# Patient Record
Sex: FEMALE | Race: WHITE | Employment: PART TIME | URBAN - METROPOLITAN AREA
[De-identification: names, ages, dates, MRNs, and addresses within clinical notes are randomized per-mention and may not be internally consistent; named-entity substitution may affect disease eponyms.]

---

## 2019-10-21 ENCOUNTER — HOSPITAL ENCOUNTER (EMERGENCY)
Age: 69
Discharge: HOME | End: 2019-10-21
Attending: EMERGENCY MEDICINE
Payer: MEDICARE

## 2019-10-21 VITALS
DIASTOLIC BLOOD PRESSURE: 73 MMHG | RESPIRATION RATE: 16 BRPM | SYSTOLIC BLOOD PRESSURE: 170 MMHG | TEMPERATURE: 98.6 F | HEART RATE: 100 BPM | OXYGEN SATURATION: 98 % | WEIGHT: 145.72 LBS

## 2019-10-21 DIAGNOSIS — K08.89 DENTALGIA: Primary | ICD-10-CM

## 2019-10-21 PROCEDURE — 99283 EMERGENCY DEPT VISIT LOW MDM: CPT

## 2019-10-21 PROCEDURE — 74011000250 HC RX REV CODE- 250: Performed by: NURSE PRACTITIONER

## 2019-10-21 PROCEDURE — 74011250637 HC RX REV CODE- 250/637: Performed by: NURSE PRACTITIONER

## 2019-10-21 RX ORDER — CHLORHEXIDINE GLUCONATE 1.2 MG/ML
15 RINSE ORAL EVERY 12 HOURS
Qty: 420 ML | Refills: 0 | Status: SHIPPED | OUTPATIENT
Start: 2019-10-21 | End: 2019-11-04

## 2019-10-21 RX ORDER — CLINDAMYCIN HYDROCHLORIDE 300 MG/1
300 CAPSULE ORAL 4 TIMES DAILY
Qty: 28 CAP | Refills: 0 | Status: SHIPPED | OUTPATIENT
Start: 2019-10-21 | End: 2019-10-28

## 2019-10-21 RX ORDER — CLINDAMYCIN HYDROCHLORIDE 150 MG/1
300 CAPSULE ORAL
Status: COMPLETED | OUTPATIENT
Start: 2019-10-21 | End: 2019-10-21

## 2019-10-21 RX ORDER — LISINOPRIL AND HYDROCHLOROTHIAZIDE 10; 12.5 MG/1; MG/1
1 TABLET ORAL DAILY
COMMUNITY

## 2019-10-21 RX ADMIN — CLINDAMYCIN HYDROCHLORIDE 300 MG: 150 CAPSULE ORAL at 19:40

## 2019-10-21 RX ADMIN — LIDOCAINE HYDROCHLORIDE: 20 SOLUTION ORAL; TOPICAL at 19:41

## 2019-10-21 NOTE — ED PROVIDER NOTES
Patient is a 71-year-old female with a past medical history significant for hypertension who was ambulatory to the ED today with complaints of right lower jaw pain. Patient states her pain started yesterday afternoon. She notes needing to have her teeth removed. She notes several loose teeth as well. Patient states she has had fevers at home as high as 100.1. Patient states she took ibuprofen at 1:30 today. She denies any drainage from the area in her mouth that is painful. Patient states she has no dental coverage to care for her teeth. Patient has no further complaints at this time. Primary care provider: The history is provided by the patient and a relative. No  was used. Past Medical History:   Diagnosis Date    Hypertension        No past surgical history on file. No family history on file.     Social History     Socioeconomic History    Marital status:      Spouse name: Not on file    Number of children: Not on file    Years of education: Not on file    Highest education level: Not on file   Occupational History    Not on file   Social Needs    Financial resource strain: Not on file    Food insecurity:     Worry: Not on file     Inability: Not on file    Transportation needs:     Medical: Not on file     Non-medical: Not on file   Tobacco Use    Smoking status: Not on file   Substance and Sexual Activity    Alcohol use: Not on file    Drug use: Not on file    Sexual activity: Not on file   Lifestyle    Physical activity:     Days per week: Not on file     Minutes per session: Not on file    Stress: Not on file   Relationships    Social connections:     Talks on phone: Not on file     Gets together: Not on file     Attends Buddhism service: Not on file     Active member of club or organization: Not on file     Attends meetings of clubs or organizations: Not on file     Relationship status: Not on file    Intimate partner violence:     Fear of current or ex partner: Not on file     Emotionally abused: Not on file     Physically abused: Not on file     Forced sexual activity: Not on file   Other Topics Concern    Not on file   Social History Narrative    Not on file         ALLERGIES: Pcn [penicillins]    Review of Systems   Constitutional: Positive for chills and fever. HENT: Positive for dental problem. Negative for sore throat and trouble swallowing. Psychiatric/Behavioral: Negative. All other systems reviewed and are negative. Vitals:    10/21/19 1854   BP: 170/73   Pulse: 100   Resp: 16   Temp: 98.6 °F (37 °C)   SpO2: 98%   Weight: 66.1 kg (145 lb 11.6 oz)          Physical Exam   Constitutional: Vital signs are normal. She appears well-developed and well-nourished. She is active and cooperative. Non-toxic appearance. She does not have a sickly appearance. She does not appear ill. No distress. 24-year-old female in no apparent distress. HENT:   Head: Normocephalic and atraumatic. Nose: Nose normal.   Mouth/Throat: Uvula is midline. Oral lesions present. No trismus in the jaw. Abnormal dentition. Dental abscesses and dental caries present. No uvula swelling. No tonsillar abscesses. Tenderness to palpation along the buccal mucosa on the left side of the mouth. Significant gingivitis, dental caries to all teeth in the mouth. No pain to the jaw on the inside of the jawline. No lymphadenopathy. No tenderness or swelling sublingual.    Eyes: EOM are normal.   Neck: No tracheal deviation present. Pulmonary/Chest: Effort normal. No respiratory distress. Musculoskeletal: Normal range of motion. Lymphadenopathy:        Head (right side): No submental, no submandibular, no tonsillar, no preauricular and no posterior auricular adenopathy present. Head (left side): No submental, no submandibular, no tonsillar, no preauricular and no posterior auricular adenopathy present. She has no cervical adenopathy.    Neurological: She is alert. Skin: Skin is warm and dry. Psychiatric: She has a normal mood and affect. Her behavior is normal. Judgment and thought content normal.   Nursing note and vitals reviewed. MDM  Number of Diagnoses or Management Options  Dentalgia:      Amount and/or Complexity of Data Reviewed  Discuss the patient with other providers: yes Jovana Nielsen DO)         Procedures      Assessment & Plan:     Orders Placed This Encounter    lisinopril-hydroCHLOROthiazide (PRINZIDE, ZESTORETIC) 10-12.5 mg per tablet    levothyroxine sodium (LEVOTHYROXINE PO)    SIMVASTATIN PO    amlodipine besylate (AMLODIPINE PO)    dental ball (lidocaine/benadryl/benzocaine) mixture    clindamycin (CLEOCIN) capsule 300 mg       Discussed with Gigi Morse DO,ED Provider    iNsa Cates NP  10/21/19  7:28 PM    7:53 PM  Patient re-evaluated. All questions answered. Patient appropriate for discharge. Given return precautions and follow up instructions. LABORATORY TESTS:  Labs Reviewed - No data to display    IMAGING RESULTS:  No orders to display       MEDICATIONS GIVEN:  Medications   dental ball (lidocaine/benadryl/benzocaine) mixture ( Mucous Membrane Given 10/21/19 1941)   clindamycin (CLEOCIN) capsule 300 mg (300 mg Oral Given 10/21/19 1940)       IMPRESSION:  1. Dentalgia        PLAN:  1. Current Discharge Medication List      START taking these medications    Details   clindamycin (CLEOCIN) 300 mg capsule Take 1 Cap by mouth four (4) times daily for 7 days. Qty: 28 Cap, Refills: 0      chlorhexidine (PERIDEX) 0.12 % solution 15 mL by Swish and Spit route every twelve (12) hours for 14 days. Indications: inflammation of the gums  Qty: 420 mL, Refills: 0           2.    Follow-up Information     Follow up With Specialties Details Why Contact Info    Liza Coles MD Internal Medicine Schedule an appointment as soon as possible for a visit  [place] [phone]      Dentist listed in this packet  Schedule an appointment as soon as possible for a visit      [facility] EMERGENCY DEPT Emergency Medicine  As needed, If symptoms worsen [address]  [phone]        3. Return to ED for new or worsening symptoms       Suki Rodriguez NP            Please note that this dictation was completed with [employer], the computer voice recognition software. Quite often unanticipated grammatical, syntax, homophones, and other interpretive errors are inadvertently transcribed by the computer software. Please disregard these errors. Please excuse any errors that have escaped final proofreading.

## 2019-10-21 NOTE — DISCHARGE INSTRUCTIONS
Thank you for allowing us to care for you today. Please follow-up with your Primary Care provider in the next 2-3 days if your symptoms do not improve. Plan for home:     Clindamycin 4 times a day for probable dental abscess    Follow-up next week in a dental clinic listed in this packet. Each provider is different. Please contact your preferred dental care provider and follow their directions. If you have a usual dentist please follow-up with them as soon as possible. You may need an oral surgeon. Please follow-up with [facility]. They have multiple locations around the [facility]. [phone]. Salt water mouth rinses to help with inflammation and infection in the mouth    Peridex mouth wash twice daily for 14 days to decrease the bacteria in your mouth. Use after eating and wait at least 30 minutes to eat or drink after using. Dental balls as needed for pain. Tylenol 1000 mg alternating with Ibuprofen 600mg every 6 hours for pain control. Example: 8am take Ibuprofen. 11am take tylenol. 2pm take ibuprofen. 5pm take tylenol. 8pm take ibuprofen. 11pm take tylenol. You may continue this process overnight if you have continued pain/discomfort. If you are on coumadin: Please monitor your INR or Coumadin levels while on this medication as it can increase your INR level substantially. Please be careful not to cut yourself. Avoid shaving until you have completed your antibody therapy. Should you develop uncontrolled bleeding please go to the closest emergency department. Come back to the ER if you have worsening symptoms, fevers over 100.9, shaking chills, nausea or vomiting.        Emergency [address]   Monday, Wednesday, Friday: 8am-5pm  Tuesday and Thursday: 8am-6pm  Phone: [phone]  $70 for Emergency Care  $60 for first routine care, then pay by sliding scale based upon income      [facility]  [address] [facility], [address] [facility] [place]   Monday-Friday: 8am-4pm  Phone: [phone] of Dentistry Urgent [facility], [address] [facility], [facility] [place] [address]  Phone: [phone] to confirm a time for emergency treatment  Pediatrics: [phone]  $75 per tooth, extractions only     [facility]  [address], [address] [place] [address]   Phone [phone] or [phone]  Hours [id number] (extractions)  Simple tooth extraction: $60 per tooth, $55 per x-ray     [facility] (in [place])  [facility] [place] only  Phone: [phone], leave message saying you need an appointment to register  Hours:  Wed 6-9p       Non-Urgent [facility] ([facility])  [address] [place] [address]  Phone: [phone]